# Patient Record
Sex: MALE | Race: WHITE
[De-identification: names, ages, dates, MRNs, and addresses within clinical notes are randomized per-mention and may not be internally consistent; named-entity substitution may affect disease eponyms.]

---

## 2020-10-10 ENCOUNTER — HOSPITAL ENCOUNTER (EMERGENCY)
Dept: HOSPITAL 43 - DL.ED | Age: 25
Discharge: HOME | End: 2020-10-10
Payer: COMMERCIAL

## 2020-10-10 DIAGNOSIS — K02.9: ICD-10-CM

## 2020-10-10 DIAGNOSIS — K03.81: Primary | ICD-10-CM

## 2020-10-10 PROCEDURE — 99282 EMERGENCY DEPT VISIT SF MDM: CPT

## 2020-10-10 NOTE — EDM.PDOC
ED HPI GENERAL MEDICAL PROBLEM





- General


Chief Complaint: ENT Problem


Stated Complaint: LEFT BOTTOM BACK, THROBING


Time Seen by Provider: 10/10/20 16:20


Source of Information: Reports: Patient, RN, RN Notes Reviewed


History Limitations: Reports: No Limitations





- History of Present Illness


INITIAL COMMENTS - FREE TEXT/NARRATIVE: 


Patient presents to the ED via personal vehicle with complaints of tooth pain.  

The patient relates his back left molar has been bother him for two months.  He 

denies visiting the dentist since this pain began, stating he has not had a 

dental visit in the past four years.  He has been utilizing Tylenol which has 

not offered him alleviation of this pain.  He states the pain is localized and 

throbbing in nature.  





  ** Left Lower Oral/Mouth


Pain Score (Numeric/FACES): 6





- Related Data


                                    Allergies











Allergy/AdvReac Type Severity Reaction Status Date / Time


 


No Known Allergies Allergy   Verified 10/10/20 16:12











Home Meds: 


                                    Home Meds





Albuterol [Take Home: Albuterol 18 GM, 1 INH Pack] 2 puff IH Q4H PRN 10/10/20 

[History]


Fluticasone Furoate [Arnuity Ellipta] 1 puff IH DAILY 10/10/20 [History]


Fluticasone Propionate [Flovent  MCG] 2 puff IN BID 10/10/20 [History]











ED ROS ENT





- Review of Systems


Review Of Systems: Comprehensive ROS is negative, except as noted in HPI.





ED EXAM, ENT





- Physical Exam


Exam: See Below


Exam Limited By: No Limitations


General Appearance: Alert, WD/WN, No Apparent Distress


Mouth/Throat: Normal Gums, Normal Lips, Normal Oropharynx, Dental Pain (To back 

left molar), Dental Tenderness, Dental Trauma (Back left molar chipped with 

significant carries).  No: Normal Teeth, Gum Swelling, Lip Ulcers, Oral Ulcers, 

Perioral Cyanosis, Throat Pain, Throat Swelling, Tongue Swelling, Tonsillar 

Erythema, Tonsillar Exudates


Head: Atraumatic, Normocephalic


Neck: Normal Inspection, Supple, Non-Tender, Full Range of Motion.  No: 

Lymphadenopathy (L), Lymphadenopathy (R), Tender Lateral, Tender Midline





Course





- Vital Signs


Last Recorded V/S: 





                                Last Vital Signs











Temp  98.7 F   10/10/20 16:15


 


Pulse  60   10/10/20 16:15


 


Resp  16   10/10/20 16:15


 


BP  139/73   10/10/20 16:15


 


Pulse Ox  96   10/10/20 16:15














- Re-Assessments/Exams


Free Text/Narrative Re-Assessment/Exam: 





10/10/20 16:36


Viscous lidocaine administered for pain.  Patient to discharge home with viscous

 lidocaine prescription with orders to follow up with dentist early next week.





Departure





- Departure


Time of Disposition: 16:26


Disposition: Home, Self-Care 01


Condition: Good


Clinical Impression: 


 Tooth pain





Chipped tooth


Qualifiers:


 Encounter type: initial encounter Fracture type: open Qualified Code(s): 

S02.5XXB - Fracture of tooth (traumatic), initial encounter for open fracture








- Discharge Information


*PRESCRIPTION DRUG MONITORING PROGRAM REVIEWED*: Not Applicable


*COPY OF PRESCRIPTION DRUG MONITORING REPORT IN PATIENT JUAN MIGUEL: Not Applicable


Instructions:  Tooth Injuries, Easy-to-Read


Additional Instructions: 


Rx: Viscous lidocaine





See dentist Monday or Tuesday of next week. 





Sepsis Event Note (ED)





- Evaluation


Sepsis Screening Result: No Definite Risk





- Focused Exam


Vital Signs: 





                                   Vital Signs











  Temp Pulse Resp BP Pulse Ox


 


 10/10/20 16:15  98.7 F  60  16  139/73  96

## 2021-01-01 ENCOUNTER — HOSPITAL ENCOUNTER (EMERGENCY)
Dept: HOSPITAL 43 - DL.ED | Age: 26
Discharge: HOME | End: 2021-01-01
Payer: COMMERCIAL

## 2021-01-01 DIAGNOSIS — J45.901: Primary | ICD-10-CM

## 2021-01-01 DIAGNOSIS — F17.210: ICD-10-CM

## 2021-01-01 DIAGNOSIS — M41.9: ICD-10-CM

## 2021-01-01 PROCEDURE — 99284 EMERGENCY DEPT VISIT MOD MDM: CPT

## 2021-01-01 PROCEDURE — 94640 AIRWAY INHALATION TREATMENT: CPT

## 2021-01-01 NOTE — EDM.PDOC
ED HPI GENERAL MEDICAL PROBLEM





- General


Chief Complaint: Respiratory Problem


Stated Complaint: INHALER RAN OUT, TROUBLE BREATHING


Time Seen by Provider: 01/01/21 18:25


Source of Information: Reports: Patient, RN, RN Notes Reviewed


History Limitations: Reports: No Limitations





- History of Present Illness


INITIAL COMMENTS - FREE TEXT/NARRATIVE: 


Pt presents to ER with c/o wheezing and asthma flare up. He lost his inhaler 

last night and is out of refills. Pt states he does not have a regular doctor 

for asthma and medication management. He states he has 2 days duration of having

trouble breathing, but worse since yesterday. Pt's oxygen sats 95% RA per triage

RN. Pt denies having pain, cough, fever, or COVID exposure.





Onset: Gradual


Duration: Getting Worse


Location: Reports: Chest


Severity: Moderate


Improves with: Reports: None


Worsens with: Reports: None


Associated Symptoms: Reports: No Other Symptoms





- Related Data


                                    Allergies











Allergy/AdvReac Type Severity Reaction Status Date / Time


 


No Known Allergies Allergy   Verified 01/01/21 17:09











Home Meds: 


                                    Home Meds





Albuterol [Take Home: Albuterol 18 GM, 1 INH Pack] 2 puff IH Q4H PRN 10/10/20 

[History]











Past Medical History


HEENT History: Reports: None


Cardiovascular History: Reports: None


Respiratory History: Reports: None


Gastrointestinal History: Reports: None


Genitourinary History: Reports: None


Musculoskeletal History: Reports: Other (See Below)


Other Musculoskeletal History: scoliosis


Neurological History: Reports: None


Psychiatric History: Reports: None


Endocrine/Metabolic History: Reports: None


Hematologic History: Reports: None


Immunologic History: Reports: None


Oncologic (Cancer) History: Reports: None


Dermatologic History: Reports: None





- Infectious Disease History


Infectious Disease History: Reports: None





- Past Surgical History


Head Surgeries/Procedures: Reports: None


Musculoskeletal Surgical History: Reports: Other (See Below)


Other Musculoskeletal Surgeries/Procedures:: rolando in back





Social & Family History





- Family History


Family Medical History: No Pertinent Family History





- Tobacco Use


Tobacco Use Status *Q: Current Some Day Tobacco User


Years of Tobacco use: 2


Packs/Tins Daily: 0.5


Second Hand Smoke Exposure: No





- Caffeine Use


Caffeine Use: Reports: Coffee, Soda


Other Caffeine Use: Pre workout





- Recreational Drug Use


Recreational Drug Use: No





- Living Situation & Occupation


Living situation: Reports: with Family





ED ROS GENERAL





- Review of Systems


Review Of Systems: Comprehensive ROS is negative, except as noted in HPI.





ED EXAM, GENERAL





- Physical Exam


Exam: See Below


Exam Limited By: No Limitations


General Appearance: Alert, WD/WN, No Apparent Distress


Nose: Normal Inspection, Normal Mucosa, No Blood


Throat/Mouth: Normal Inspection, Normal Lips, Normal Teeth, Normal Gums, Normal 

Oropharynx, Normal Voice, No Airway Compromise


Head: Atraumatic, Normocephalic


Neck: Normal Inspection, Supple, Non-Tender, Full Range of Motion


Respiratory/Chest: No Respiratory Distress, No Accessory Muscle Use, Chest Non-T

merlene, Wheezing.  No: Crackles, Rales, Rhonchi, Stridor


Cardiovascular: Regular Rate, Rhythm


Back Exam: Normal Inspection


Neurological: Alert, Oriented, No Motor/Sensory Deficits


Psychiatric: Normal Mood


Skin Exam: Warm, Dry, Intact, Normal Color, No Rash





Course





- Vital Signs


Last Recorded V/S: 





                                Last Vital Signs











Temp  98.3 F   01/01/21 17:06


 


Pulse  72   01/01/21 17:06


 


Resp  16   01/01/21 17:06


 


BP  138/85   01/01/21 17:06


 


Pulse Ox  95   01/01/21 17:06














- Orders/Labs/Meds


Orders: 





                               Active Orders 24 hr











 Category Date Time Status


 


 RT Aerosol Therapy [RC] ASDIRECTED Care  01/01/21 18:31 Active


 


 predniSONE Med  01/01/21 18:34 Once





 60 mg PO ONETIME ONE   











Meds: 





Medications














Discontinued Medications














Generic Name Dose Route Start Last Admin





  Trade Name Gerq  PRN Reason Stop Dose Admin


 


Albuterol  6.7 gm  01/01/21 18:32 





  Proventil Hfa  INH  01/01/21 18:33 





  ONETIME ONE  


 


Albuterol/Ipratropium  3 ml  01/01/21 18:31 





  Duoneb 3.0-0.5 Mg/3 Ml  NEB  01/01/21 18:32 





  ONETIME ONE  














Departure





- Departure


Time of Disposition: 18:39


Disposition: Home, Self-Care 01


Condition: Good


Clinical Impression: 


 Acute asthma, Exacerbation of asthma








- Discharge Information


*PRESCRIPTION DRUG MONITORING PROGRAM REVIEWED*: Not Applicable


*COPY OF PRESCRIPTION DRUG MONITORING REPORT IN PATIENT JUAN MIGUEL: Not Applicable


Instructions:  Asthma, Adult, Easy-to-Read


Additional Instructions: 


Rx: Prednisone 20mg


Rx: Albuterol Inhaler


Follow up at [facility] in [place] this week for recheck and medication 

management. Call [phone] to make an appointment.


Return to ER if you develop difficulty breathing.





Sepsis Event Note (ED)





- Evaluation


Sepsis Screening Result: No Definite Risk





- Focused Exam


Vital Signs: 





                                   Vital Signs











  Temp Pulse Resp BP Pulse Ox


 


 01/01/21 17:06  98.3 F  72  16  138/85  95














- My Orders


Last 24 Hours: 





My Active Orders





01/01/21 18:31


RT Aerosol Therapy [RC] ASDIRECTED 





01/01/21 18:34


predniSONE   60 mg PO ONETIME ONE 














- Assessment/Plan


Last 24 Hours: 





My Active Orders





01/01/21 18:31


RT Aerosol Therapy [RC] ASDIRECTED 





01/01/21 18:34


predniSONE   60 mg PO ONETIME ONE